# Patient Record
(demographics unavailable — no encounter records)

---

## 2025-02-12 NOTE — ADDENDUM
[FreeTextEntry1] : This note was written by Get Chu on 02/12/2025 acting as scribe for Dr. Eddie Zapata M.D.   I, Dr. Eddie Zapata, have read and attest that all the information, medical decision making and discharge instructions within are true and accurate.

## 2025-02-12 NOTE — PHYSICAL EXAM
[de-identified] : General appearance: well-nourished and hydrated, pleasant, alert and oriented x 3, cooperative. HEENT: Normocephalic, EOM intact, Nasal septum midline, Oral cavity clear, External auditory canal clear. Cardiovascular: no apparent abnormalities, no lower leg edema, no varicosities, pedal pulses are palpable. Lymphatics Lymph nodes: none palpated, Lymphedema: not present. Neurologic: sensation is normal, no muscle weakness in upper or lower extremities, patella tendon reflexes intact. Dermatologic no apparent skin lesions, moist, warm, no rash. Spine: tenderness of the LS spine and limited mobility. Gait: nonantalgic.   Left knee Inspection: mild effusion. Wounds: none. Alignment: normal. Palpation: peripatellar and lateral joint line tenderness on palpation. ROM active (in degrees): 0-120 with crepitus and discomfort  Ligamentous laxity: all ligaments appear stable,, negative ant. drawer test, negative post. drawer test, stable to varus stress test, stable to valgus stress test. negative Lachman's test, negative pivot shift test Meniscal Test: negative McMurrays, negative Conor. Patellofemoral Alignment Test: Q angle-, normal. Muscle Test: good quad strength. Leg examination: calf is soft and non-tender.   Right knee Inspection: trace effusion. Wounds: none. Alignment: normal. Palpation: medial tenderness on palpation. ROM active (in degrees): 0-130 with crepitus Ligamentous laxity: all ligaments appear stable, negative ant. drawer test, negative post. drawer test, stable to varus stress test, stable to valgus stress test. negative Lachman's test, negative pivot shift test Meniscal Test: negative McMurrays, negative Conor. Patellofemoral Alignment Test: Q angle-, normal. Muscle Test: good quad strength. Leg examination: tenderness mid-belly of left hamstring on palpation. [de-identified] : Left knee x-rays, standing AP/Lateral and Merchant films, and 45-degree PA standing view, taken at the office today shows diffuse tricompartmental degenerative arthritis, lateral joint space narrowing, bone on bone sclerosis especially on the Reddy view, patella at appropriate height and central position, patellofemoral joint space narrowing, Kellgren and Anuj grade 3-4.  Right knee x-rays, standing AP/Lateral and Merchant films, and 45-degree PA standing view, taken at the office today shows diffuse tricompartmental degenerative arthritis, lateral joint space narrowing, bone on bone sclerosis especially on the Reddy view, patella at appropriate height and central position, patellofemoral joint space narrowing, degenerative cyst in lateral trochlea, Kellgren and Anuj grade 3-4.

## 2025-02-12 NOTE — DISCUSSION/SUMMARY
[de-identified] : Discussed at length the nature of the patients condition. Their bilateral knee symptoms appear secondary to degenerative arthritis with an acute episode on the left knee from events as noted as above, as well as a quadriceps tendonitis. We reviewed operative and nonoperative treatment. While I believe she would eventually benefit from staged bilateral TKRs, she wants to avoid surgery at this time. Therefore, we will continue nonoperatively. We will schedule bilateral knee viscosupplementation injections. In the interim, for the acute pain patient was given bilateral knee cortisone injections today as detailed above for symptomatic relief. I also suggested she continue with PT, weight management, and Advil prn. They can continue activities as tolerated.

## 2025-02-12 NOTE — HISTORY OF PRESENT ILLNESS
[de-identified] : BETY CHILDERS  78 year old female presents for evaluation of B/L knee OA. She had B/L cortisone injections in August followed by Euflexxa injections in September. She was doing well after injections and was having minimal pain. She states last week injured left knee while cleaning which she believes she twisted it. Pt has been going to PT for the back and knees as we believe her back pain was related to her L4-5 area. Her therapist is focusing on her knee since the injury. She wants another round of injections. She has a trip to [place] in March for which she will be away for 5 weeks.

## 2025-02-12 NOTE — PROCEDURE
[de-identified] : BILATERAL KNEE CORTISONE INJECTION Discussed at length with the patient the planned steroid and lidocaine injection. The risks, benefits, convalescence and alternatives were reviewed. The possible side effects discussed included but were not limited to: pain, swelling, heat and redness. These symptoms are generally mild but if they are extensive then contact the office. Giving pain relievers by mouth such as NSAIDs or Tylenol can generally treat the reactions to steroid and lidocaine. Rare cases of infection have been noted. Rash, hives and itching may occur post injection. If you have muscle pain or cramps, flushing and or swelling of the face, rapid heart beat, nausea, dizziness, fever, chills, headache, difficulty breathing, swelling in the arms or legs, or have a prickly feeling of your skin, contact a health care provider immediately.  Following this discussion, the knee was prepped with betadine and under sterile conditions 5 cc of 2% lidocaine and 1 cc depo-medrol (40mg) were injected with a 21 gauge needle. The needle was introduced into the joint, aspiration was performed to ensure intra-articular placement and the medication was injected. Upon withdrawal of the needle the site was cleaned with alcohol and a bandaid applied. The patient tolerated the injection well and there were no adverse effects. Post injection instructions included no strenuous activity for 24 hours, cryotherapy and if there are any adverse effects to contact the office.

## 2025-03-26 NOTE — PROCEDURE
[de-identified] : Euflexxa # 1 Bilateral Knee Discussed at length with the patient the planned Euflexxa injection. The risks, benefits, convalescence and alternatives were reviewed. The possible side effects discussed included but were not limited to: pain, swelling, heat and redness. There symptoms are generally mild but if they are extensive then contact the office. Giving pain relievers by mouth such as NSAIDs or Tylenol can generally treat the reactions to Euflexxa. Rare cases of infection have been noted. Rash, hives and itching may occur post injection. If you have muscle pain or cramps, flushing and or swelling of the face, rapid heart beat, nausea, dizziness, fever, chills, headache, difficulty breathing, swelling in the arms or legs, or have a prickly feeling of your skin, contact a health care provider immediately.  Following this discussion, the knee was prepped with betadine and under sterile condition the Euflexxa injection was performed with a 22 gauge needle. The needle was introduced into the joint, aspiration was performed to ensure intra-articular placement and the medication was injected. Upon withdrawal of the needle the site was cleaned with alcohol and a bandaid applied. The patient tolerated the injection well and there were no adverse effects. Post injection instructions included no strenuous activity for 24 hours, cryotherapy and if there are any adverse effects to contact the office.

## 2025-03-26 NOTE — PROCEDURE
[de-identified] : Euflexxa # 1 Bilateral Knee Discussed at length with the patient the planned Euflexxa injection. The risks, benefits, convalescence and alternatives were reviewed. The possible side effects discussed included but were not limited to: pain, swelling, heat and redness. There symptoms are generally mild but if they are extensive then contact the office. Giving pain relievers by mouth such as NSAIDs or Tylenol can generally treat the reactions to Euflexxa. Rare cases of infection have been noted. Rash, hives and itching may occur post injection. If you have muscle pain or cramps, flushing and or swelling of the face, rapid heart beat, nausea, dizziness, fever, chills, headache, difficulty breathing, swelling in the arms or legs, or have a prickly feeling of your skin, contact a health care provider immediately.  Following this discussion, the knee was prepped with betadine and under sterile condition the Euflexxa injection was performed with a 22 gauge needle. The needle was introduced into the joint, aspiration was performed to ensure intra-articular placement and the medication was injected. Upon withdrawal of the needle the site was cleaned with alcohol and a bandaid applied. The patient tolerated the injection well and there were no adverse effects. Post injection instructions included no strenuous activity for 24 hours, cryotherapy and if there are any adverse effects to contact the office.

## 2025-07-30 NOTE — PHYSICAL EXAM
[de-identified] : General appearance: well-nourished and hydrated, pleasant, alert and oriented x 3, cooperative. HEENT: Normocephalic, EOM intact, Nasal septum midline, Oral cavity clear, External auditory canal clear. Cardiovascular: no apparent abnormalities, no lower leg edema, no varicosities, pedal pulses are palpable. Lymphatics Lymph nodes: none palpated, Lymphedema: not present. Neurologic: sensation is normal, no muscle weakness in upper or lower extremities, patella tendon reflexes intact. Dermatologic no apparent skin lesions, moist, warm, no rash. Spine: tenderness of the LS spine and limited mobility. Gait: nonantalgic.   Left knee Inspection: mild to moderate effusion. Wounds: none. Alignment: 10 degrees valgus alignment. Palpation: medial and lateral tenderness on palpation. ROM active (in degrees): 0-130 with crepitus and some discomfort  Ligamentous laxity: all ligaments appear stable,, negative ant. drawer test, negative post. drawer test, stable to varus stress test, stable to valgus stress test. negative Lachman's test, negative pivot shift test Meniscal Test: negative McMurrays, negative Conor. Patellofemoral Alignment Test: Q angle-, normal. Muscle Test: good quad strength. Leg examination: calf is soft and non-tender.   Right knee Inspection: mild to moderate effusion. Wounds: none. Alignment: 10 degrees valgus alignment. Palpation: medial and lateral tenderness on palpation. ROM active (in degrees): 0-130 with crepitus and some discomfort Ligamentous laxity: all ligaments appear stable, negative ant. drawer test, negative post. drawer test, stable to varus stress test, stable to valgus stress test. negative Lachman's test, negative pivot shift test Meniscal Test: negative McMurrays, negative Conor. Patellofemoral Alignment Test: Q angle-, normal. Muscle Test: good quad strength. Leg examination: calf is soft and non-tender.   [de-identified] : Left knee x-rays, standing AP/Lateral and Merchant films, and 45-degree PA standing view, taken at the office today shows diffuse tricompartmental degenerative arthritis, lateral joint space narrowing, bone on bone sclerosis especially on the Reddy view, patella at appropriate height and central position, patellofemoral joint space narrowing, Kellgren and Anuj grade 3-4.  Right knee x-rays, standing AP/Lateral and Merchant films, and 45-degree PA standing view, taken at the office today shows diffuse tricompartmental degenerative arthritis, lateral joint space narrowing, bone on bone sclerosis especially on the Reddy view, patella at appropriate height and central position, patellofemoral joint space narrowing, degenerative cyst in lateral trochlea, Kellgren and Anuj grade 3-4.

## 2025-07-30 NOTE — ADDENDUM
[FreeTextEntry1] : This note was written by Maxi Del Rosario on 07/30/2025 acting as scribe for Dr. Eddie Zapata M.D.   I, Dr. Eddie Zapata, have read and attest that all the information, medical decision making and discharge instructions within are true and accurate.

## 2025-07-30 NOTE — PROCEDURE
[de-identified] : BILATERAL KNEE CORTISONE INJECTION Discussed at length with the patient the planned steroid and lidocaine injection. The risks, benefits, convalescence and alternatives were reviewed. The possible side effects discussed included but were not limited to: pain, swelling, heat and redness. These symptoms are generally mild but if they are extensive then contact the office. Giving pain relievers by mouth such as NSAIDs or Tylenol can generally treat the reactions to steroid and lidocaine. Rare cases of infection have been noted. Rash, hives and itching may occur post injection. If you have muscle pain or cramps, flushing and or swelling of the face, rapid heart beat, nausea, dizziness, fever, chills, headache, difficulty breathing, swelling in the arms or legs, or have a prickly feeling of your skin, contact a health care provider immediately.   Following this discussion, the knee was prepped with betadine and under sterile conditions 5 cc of 2% lidocaine and 1 cc depo-medrol (40mg) were injected with a 21 gauge needle. The needle was introduced into the joint, aspiration was performed to ensure intra-articular placement and the medication was injected. Upon withdrawal of the needle the site was cleaned with alcohol and a bandaid applied. The patient tolerated the injection well and there were no adverse effects. Post injection instructions included no strenuous activity for 24 hours, cryotherapy and if there are any adverse effects to contact the office.

## 2025-07-30 NOTE — HISTORY OF PRESENT ILLNESS
[de-identified] : BETY CHILDERS  78 year old female presents for evaluation of B/L knee pain. We last saw her in February where she received B/L cortisone injection and finished a gel series in March. She would like to repeat cortisone today and plan to repeat gels in September as she has an upcoming trip to [place] in September for 1 month. Upon her return, she would like to consider a TKR sometime early next yr.

## 2025-07-30 NOTE — DISCUSSION/SUMMARY
[de-identified] : Discussed at length the nature of the patients condition. Their B/L knee symptoms appear secondary to degenerative arthritis. We reviewed operative and nonoperative treatment. While she knows she eventually needs a TKR, she is planning for a TKR in the new year. Therefore, we will continue nonoperatively. We will schedule for B/L knee viscosupplementation injections in September. In the interim, for the acute pain patient was given a B/L knee cortisone injection today as detailed above for symptomatic relief. I also suggested that she continue with a home exercise program, weight management, and Tylenol prn. They can continue activities as tolerated.